# Patient Record
Sex: FEMALE | URBAN - METROPOLITAN AREA
[De-identification: names, ages, dates, MRNs, and addresses within clinical notes are randomized per-mention and may not be internally consistent; named-entity substitution may affect disease eponyms.]

---

## 2023-05-24 ENCOUNTER — ANESTHESIA EVENT (OUTPATIENT)
Dept: PERIOP | Facility: HOSPITAL | Age: 88
End: 2023-05-24

## 2023-05-24 RX ORDER — FUROSEMIDE 40 MG/1
40 TABLET ORAL 3 TIMES WEEKLY
COMMUNITY
Start: 2023-05-19

## 2023-05-24 RX ORDER — ALBUTEROL SULFATE 90 UG/1
2 AEROSOL, METERED RESPIRATORY (INHALATION) EVERY 6 HOURS PRN
COMMUNITY
Start: 2023-04-18

## 2023-05-24 RX ORDER — SENNOSIDES 8.6 MG
650 CAPSULE ORAL AS NEEDED
COMMUNITY

## 2023-05-24 RX ORDER — NYSTATIN 100000 U/G
1 CREAM TOPICAL 2 TIMES DAILY
COMMUNITY
Start: 2022-06-25 | End: 2023-06-25

## 2023-05-24 RX ORDER — FLUTICASONE PROPIONATE AND SALMETEROL XINAFOATE 230; 21 UG/1; UG/1
2 AEROSOL, METERED RESPIRATORY (INHALATION) 2 TIMES DAILY
COMMUNITY

## 2023-05-24 RX ORDER — AMOXICILLIN 250 MG
1 CAPSULE ORAL 2 TIMES DAILY PRN
COMMUNITY
Start: 2023-05-17 | End: 2024-05-16

## 2023-05-24 RX ORDER — ALBUTEROL SULFATE 2.5 MG/3ML
2.5 SOLUTION RESPIRATORY (INHALATION) EVERY 6 HOURS PRN
COMMUNITY
Start: 2023-04-04

## 2023-05-24 RX ORDER — DILTIAZEM HYDROCHLORIDE EXTENDED-RELEASE TABLETS 180 MG/1
180 TABLET, EXTENDED RELEASE ORAL DAILY
COMMUNITY

## 2023-05-24 RX ORDER — PSEUDOEPHEDRINE HCL 30 MG
100 TABLET ORAL DAILY
COMMUNITY

## 2023-05-24 NOTE — PRE-PROCEDURE INSTRUCTIONS
Pre-Surgery Instructions:   Medication Instructions   • acetaminophen (TYLENOL) 650 mg CR tablet Uses PRN- OK to take day of surgery   • albuterol (2 5 mg/3 mL) 0 083 % nebulizer solution Uses PRN- OK to take day of surgery   • albuterol (ProAir HFA) 90 mcg/act inhaler Uses PRN- OK to take day of surgery   • apixaban (ELIQUIS) 2 5 mg Instructions provided by MD   • dilTIAZem HCl  MG TB24 Take day of surgery  • Docusate Sodium (DSS) 100 MG CAPS Uses PRN- OK to take day of surgery   • fluticasone-salmeterol (ADVAIR HFA) 230-21 MCG/ACT inhaler Take day of surgery  • furosemide (LASIX) 40 mg tablet Hold day of surgery  • nystatin (MYCOSTATIN) cream Hold day of surgery  • senna-docusate sodium (SENOKOT S) 8 6-50 mg per tablet Uses PRN- OK to take day of surgery     Pt's dghtr verbalizes understanding of the following:    - Bathing instructions, will use dial  - No lotions, powders, sprays, deodorant, jewelry    - DO NOT EAT OR DRINK ANYTHING after midnight on the evening before your procedure including coffee, tea, gum or hard candy     - ONLY SIPS OF WATER with your medications are allowed on the morning of your procedure  - Avoid OTC non-directed Vit/ Suppl/ Herbals 7 days prior to surgery to ensure no drug interactions with perioperative surgical/ anesthetic meds  - Avoid NSAIDs 3 days prior  - Avoid ASA containing products 5 days prior    - Bring a list of meds you take at home with your last dose noted  - Bring INHALER you take for breathing problems     - Arrange for someone to drive you home after the procedure & stay with you until the next morning  - Bring insurance cards & photo id    - Leave all valuables such as credit cards, money & jewelry at home    - Notify surgeon if you develop any cold symptoms, change in your health history or develop a rash/ open wounds     - Did the surgeon's office give you any other special instructions? No  - Did surgeon require any clearances?  CC

## 2023-05-31 ENCOUNTER — HOSPITAL ENCOUNTER (OUTPATIENT)
Facility: HOSPITAL | Age: 88
Setting detail: OUTPATIENT SURGERY
Discharge: HOME | End: 2023-05-31
Attending: PLASTIC SURGERY | Admitting: PLASTIC SURGERY
Payer: MEDICARE

## 2023-05-31 ENCOUNTER — ANESTHESIA (OUTPATIENT)
Dept: PERIOP | Facility: HOSPITAL | Age: 88
End: 2023-05-31

## 2023-05-31 VITALS
BODY MASS INDEX: 20.91 KG/M2 | OXYGEN SATURATION: 94 % | DIASTOLIC BLOOD PRESSURE: 67 MMHG | HEART RATE: 66 BPM | SYSTOLIC BLOOD PRESSURE: 164 MMHG | HEIGHT: 63 IN | TEMPERATURE: 99.4 F | WEIGHT: 118 LBS | RESPIRATION RATE: 18 BRPM

## 2023-05-31 DIAGNOSIS — D49.2 NEOPLASM OF UNSPECIFIED BEHAVIOR OF BONE, SOFT TISSUE, AND SKIN: ICD-10-CM

## 2023-05-31 PROCEDURE — 88305 TISSUE EXAM BY PATHOLOGIST: CPT | Performed by: PATHOLOGY

## 2023-05-31 RX ORDER — FENTANYL CITRATE/PF 50 MCG/ML
25 SYRINGE (ML) INJECTION
Status: CANCELLED | OUTPATIENT
Start: 2023-05-31

## 2023-05-31 RX ORDER — ONDANSETRON 2 MG/ML
INJECTION INTRAMUSCULAR; INTRAVENOUS AS NEEDED
Status: DISCONTINUED | OUTPATIENT
Start: 2023-05-31 | End: 2023-05-31

## 2023-05-31 RX ORDER — ONDANSETRON 2 MG/ML
4 INJECTION INTRAMUSCULAR; INTRAVENOUS ONCE AS NEEDED
Status: CANCELLED | OUTPATIENT
Start: 2023-05-31

## 2023-05-31 RX ORDER — FENTANYL CITRATE 50 UG/ML
INJECTION, SOLUTION INTRAMUSCULAR; INTRAVENOUS AS NEEDED
Status: DISCONTINUED | OUTPATIENT
Start: 2023-05-31 | End: 2023-05-31

## 2023-05-31 RX ORDER — PROPOFOL 10 MG/ML
INJECTION, EMULSION INTRAVENOUS CONTINUOUS PRN
Status: DISCONTINUED | OUTPATIENT
Start: 2023-05-31 | End: 2023-05-31

## 2023-05-31 RX ORDER — MAGNESIUM HYDROXIDE 1200 MG/15ML
LIQUID ORAL AS NEEDED
Status: DISCONTINUED | OUTPATIENT
Start: 2023-05-31 | End: 2023-05-31 | Stop reason: HOSPADM

## 2023-05-31 RX ORDER — LIDOCAINE HYDROCHLORIDE AND EPINEPHRINE 10; 10 MG/ML; UG/ML
INJECTION, SOLUTION INFILTRATION; PERINEURAL AS NEEDED
Status: DISCONTINUED | OUTPATIENT
Start: 2023-05-31 | End: 2023-05-31 | Stop reason: HOSPADM

## 2023-05-31 RX ORDER — BALANCED SALT SOLUTION 6.4; .75; .48; .3; 3.9; 1.7 MG/ML; MG/ML; MG/ML; MG/ML; MG/ML; MG/ML
SOLUTION OPHTHALMIC AS NEEDED
Status: DISCONTINUED | OUTPATIENT
Start: 2023-05-31 | End: 2023-05-31 | Stop reason: HOSPADM

## 2023-05-31 RX ORDER — ONDANSETRON 4 MG/1
4 TABLET, ORALLY DISINTEGRATING ORAL EVERY 6 HOURS PRN
Status: DISCONTINUED | OUTPATIENT
Start: 2023-05-31 | End: 2023-05-31 | Stop reason: HOSPADM

## 2023-05-31 RX ORDER — SODIUM CHLORIDE, SODIUM LACTATE, POTASSIUM CHLORIDE, CALCIUM CHLORIDE 600; 310; 30; 20 MG/100ML; MG/100ML; MG/100ML; MG/100ML
INJECTION, SOLUTION INTRAVENOUS CONTINUOUS PRN
Status: DISCONTINUED | OUTPATIENT
Start: 2023-05-31 | End: 2023-05-31

## 2023-05-31 RX ORDER — ACETAMINOPHEN 325 MG/1
650 TABLET ORAL EVERY 6 HOURS PRN
Status: DISCONTINUED | OUTPATIENT
Start: 2023-05-31 | End: 2023-05-31 | Stop reason: HOSPADM

## 2023-05-31 RX ORDER — MINERAL OIL
OIL (ML) MISCELLANEOUS AS NEEDED
Status: DISCONTINUED | OUTPATIENT
Start: 2023-05-31 | End: 2023-05-31 | Stop reason: HOSPADM

## 2023-05-31 RX ORDER — LIDOCAINE HYDROCHLORIDE 10 MG/ML
INJECTION, SOLUTION EPIDURAL; INFILTRATION; INTRACAUDAL; PERINEURAL AS NEEDED
Status: DISCONTINUED | OUTPATIENT
Start: 2023-05-31 | End: 2023-05-31

## 2023-05-31 RX ORDER — SODIUM CHLORIDE, SODIUM LACTATE, POTASSIUM CHLORIDE, CALCIUM CHLORIDE 600; 310; 30; 20 MG/100ML; MG/100ML; MG/100ML; MG/100ML
125 INJECTION, SOLUTION INTRAVENOUS CONTINUOUS
Status: DISCONTINUED | OUTPATIENT
Start: 2023-05-31 | End: 2023-05-31 | Stop reason: HOSPADM

## 2023-05-31 RX ORDER — PROPOFOL 10 MG/ML
INJECTION, EMULSION INTRAVENOUS AS NEEDED
Status: DISCONTINUED | OUTPATIENT
Start: 2023-05-31 | End: 2023-05-31

## 2023-05-31 RX ORDER — HYDROMORPHONE HCL IN WATER/PF 6 MG/30 ML
0.2 PATIENT CONTROLLED ANALGESIA SYRINGE INTRAVENOUS
Status: CANCELLED | OUTPATIENT
Start: 2023-05-31 | End: 2023-05-31

## 2023-05-31 RX ADMIN — FENTANYL CITRATE 25 MCG: 50 INJECTION, SOLUTION INTRAMUSCULAR; INTRAVENOUS at 11:17

## 2023-05-31 RX ADMIN — FENTANYL CITRATE 25 MCG: 50 INJECTION, SOLUTION INTRAMUSCULAR; INTRAVENOUS at 11:01

## 2023-05-31 RX ADMIN — SODIUM CHLORIDE, SODIUM LACTATE, POTASSIUM CHLORIDE, AND CALCIUM CHLORIDE: .6; .31; .03; .02 INJECTION, SOLUTION INTRAVENOUS at 10:55

## 2023-05-31 RX ADMIN — PROPOFOL 40 MCG/KG/MIN: 10 INJECTION, EMULSION INTRAVENOUS at 10:57

## 2023-05-31 RX ADMIN — ONDANSETRON 4 MG: 2 INJECTION INTRAMUSCULAR; INTRAVENOUS at 11:39

## 2023-05-31 RX ADMIN — FENTANYL CITRATE 25 MCG: 50 INJECTION, SOLUTION INTRAMUSCULAR; INTRAVENOUS at 11:09

## 2023-05-31 RX ADMIN — PROPOFOL 30 MG: 10 INJECTION, EMULSION INTRAVENOUS at 10:57

## 2023-05-31 RX ADMIN — LIDOCAINE HYDROCHLORIDE 50 MG: 10 INJECTION, SOLUTION EPIDURAL; INFILTRATION; INTRACAUDAL at 10:57

## 2023-05-31 RX ADMIN — FENTANYL CITRATE 25 MCG: 50 INJECTION, SOLUTION INTRAMUSCULAR; INTRAVENOUS at 11:39

## 2023-05-31 NOTE — OP NOTE
OPERATIVE REPORT  PATIENT NAME: Niall Salazar    :  1935  MRN: [id number]  Pt Location:  OR ROOM 08    SURGERY DATE: 2023    Surgeon(s) and Role:     * Rosalee Franco MD - Primary    Preop and postoperative diagnosis: Right cheek lesion    Procedure(s):  Right - EXCISION CHEEK LESION  Right - STSG    Specimen(s):  ID Type Source Tests Collected by Time Destination   1 : Right cheek lesion suture @ superior margin Tissue Lesion TISSUE EXAM Rosalee Franco MD 2023 1103      Operative history: The patient for short time she says has had a large nodule develop on the right cheek area suspicious for skin cancer  Is the typical nodular lesion with a central keratin plug seen with squamous cell carcinoma  It measured 45 mm in diameter with a erythematous extension toward the nose of another centimeter  It was removed taking 5 mm margins  A suture was placed at the superior margin adjacent to the lateral canthal tendon region of the right orbit  A skin graft was necessary to cover the ensuing defect so created after its removal of size approximately 35 cm²  Operative procedure: The patient was taken to the operating placed supine on the operating table  She was prepped and draped in usual fashion  Anesthesia was general supplemented Xylocaine 1% with epinephrine  First wide excision of the right cheek lesion was performed and it was marked appropriately  This was facilitated with the Bovie for cutting and coagulation purposes  Next a [employer] electric dermatome was used to harvest a fourteen 1007 inch split-thickness skin graft from the right anterior lateral thigh  The graft was meshed 1/2-1  The graft was placed on the right cheek area with staples for fixation  A bulky dressing much like a stent was sewn over this with 3-0 silk simple sutures  The extra skin was returned to the donor site of the right anterior thigh where it also stapled into position with intent to hasten healing  Light dressings were placed on the left thigh  The patient tolerated the procedure well was taken to the recovery in good condition        SIGNATURE: Cameron Leonard MD  DATE: May 31, 2023  TIME: 12:00 PM

## 2023-05-31 NOTE — INTERVAL H&P NOTE
H&P reviewed  After examining the patient I find no changes in the patients condition since the H&P had been written      Vitals:    05/31/23 0918   BP: 169/72   Pulse: 65   Resp: 18   Temp: 99 °F (37 2 °C)   SpO2: 94%
H&P reviewed  After examining the patient I find no changes in the patients condition since the H&P had been written      Vitals:    05/31/23 0918   BP: 169/72   Pulse: 65   Resp: 18   Temp: 99 °F (37 2 °C)   SpO2: 94%
No cyanosis, clubbing or edema

## 2023-05-31 NOTE — ANESTHESIA PREPROCEDURE EVALUATION
Procedure:  EXCISION CHEEK LESION (Right: Face)  STSG (Right: Face)    Relevant Problems   No relevant active problems      Irregular heart beat Chronic kidney disease   [facility] ) Hypertension   Anemia Pulmonary embolism ([facility] )   Asthma Cancer ([place] [address] )   Pelvic fracture ([facility] ) COVID   Cholecystitis Pulmonary emphysema ([facility])   COPD (chronic obstructive pulmonary disease) ([place] [address] ) Coronary artery disease   Walker as ambulation aid Acute on chronic respiratory failure ([facility])   Sepsis ([facility])        Physical Exam    Airway    Mallampati score: II  TM Distance: <3 FB  Neck ROM: full     Dental   No notable dental hx     Cardiovascular  Cardiovascular exam normal    Pulmonary  Pulmonary exam normal     Other Findings        Anesthesia Plan  ASA Score- 4     Anesthesia Type- IV sedation with anesthesia with ASA Monitors  Additional Monitors:   Airway Plan:           Plan Factors-Exercise tolerance (METS): <4 METS  Chart reviewed  EKG reviewed  Existing labs reviewed  Patient is not a current smoker  Patient not instructed to abstain from smoking on day of procedure  Patient did not smoke on day of surgery  Induction- intravenous  Postoperative Plan-     Informed Consent- Anesthetic plan and risks discussed with patient  I personally reviewed this patient with the CRNA  Discussed and agreed on the Anesthesia Plan with the CRNA  Haroldo Winters

## 2023-05-31 NOTE — ANESTHESIA POSTPROCEDURE EVALUATION
Post-Op Assessment Note    CV Status:  Stable  Pain Score: 0    Pain management: adequate     Mental Status:  Alert and awake   Hydration Status:  Euvolemic   PONV Controlled:  Controlled   Airway Patency:  Patent      Post Op Vitals Reviewed: Yes      Staff: CRNA         No notable events documented      /70 (05/31/23 1150)    Temp 98 4 °F (36 9 °C) (05/31/23 1150)    Pulse 56 (05/31/23 1150)   Resp 16 (05/31/23 1150)    SpO2 96 % (05/31/23 1150)

## 2023-06-05 PROCEDURE — 88305 TISSUE EXAM BY PATHOLOGIST: CPT | Performed by: PATHOLOGY

## 2023-07-18 ENCOUNTER — CONSULT (OUTPATIENT)
Dept: SURGERY | Facility: CLINIC | Age: 88
End: 2023-07-18
Payer: MEDICARE

## 2023-07-18 VITALS
OXYGEN SATURATION: 97 % | TEMPERATURE: 98 F | DIASTOLIC BLOOD PRESSURE: 62 MMHG | HEIGHT: 62 IN | SYSTOLIC BLOOD PRESSURE: 136 MMHG | WEIGHT: 118 LBS | HEART RATE: 65 BPM | BODY MASS INDEX: 21.71 KG/M2

## 2023-07-18 DIAGNOSIS — J43.1 PANLOBULAR EMPHYSEMA (HCC): ICD-10-CM

## 2023-07-18 DIAGNOSIS — K80.10 CHRONIC CHOLECYSTITIS WITH CALCULUS: Primary | ICD-10-CM

## 2023-07-18 DIAGNOSIS — I48.91 ATRIAL FIBRILLATION (HCC): ICD-10-CM

## 2023-07-18 DIAGNOSIS — I50.32 CHRONIC DIASTOLIC CONGESTIVE HEART FAILURE (HCC): ICD-10-CM

## 2023-07-18 PROCEDURE — 99204 OFFICE O/P NEW MOD 45 MIN: CPT | Performed by: SPECIALIST

## 2023-07-24 NOTE — PROGRESS NOTES
Chief Complaint: Percutaneous cholecystostomy tube      History of Present Illness: The patient is a delightful 73-year-old white female in a wheelchair on nasal oxygen who presents to the office today with her daughter Jermaine Trent. She has an extensive past medical history which includes atrial fibrillation on chronic anticoagulation therapy, history of renal failure in the past, diastolic congestive heart failure, emphysema etc. etc.  She presents to the office today with her daughter to discuss possibly having her cholecystostomy tube removed and undergoing cholecystectomy. In discussing the situation with the patient she has no pain. She is tolerating her diet. She has good GI function. The tube is more of a nuisance and does not cause her discomfort. It may get caught on something every now and then etc. but other than that. ... Past Medical History:   Past Medical History:   Diagnosis Date   • Acute on chronic respiratory failure ([address])    • Anemia    • Asthma    • Cancer ([address])     basal cell multi sites   • Cholecystitis    • Chronic kidney disease    • COPD (chronic obstructive pulmonary disease) ([address])    • Coronary artery disease    • COVID 2020 2022   • Dialysis patient [facility])     T-Th-Sat   • Hypertension    • Irregular heart beat     AFIB   • Pelvic fracture ([facility]CC)    • Pulmonary embolism (HCC)    • Pulmonary emphysema ([address])    • Sepsis ([address])    • Walker as ambulation aid          Past Surgical History:    Past Surgical History:   Procedure Laterality Date   • BILIARY DRAINAGE CATHETER PLACEMENT     • FACIAL/NECK BIOPSY Right 5/31/2023    Procedure: EXCISION CHEEK LESION;  Surgeon: Blas Pinon MD;  Location: [address] [place] [address] MAIN OR;  Service: Plastics   • SKIN CANCER EXCISION      multiple   • SPLIT THICKNESS SKIN GRAFT Right 5/31/2023    Procedure: STSG;  Surgeon: Blas Pinon MD;  Location:  MAIN OR;  Service: Plastics         Allergies:     Allergies   Allergen Reactions   • Codeine Dizziness, Confusion and Other (See Comments)         Medications:    Current Outpatient Medications:   •  acetaminophen (TYLENOL) 650 mg CR tablet, Take 650 mg by mouth if needed, Disp: , Rfl:   •  albuterol (2.5 mg/3 mL) 0.083 % nebulizer solution, Inhale 2.5 mg every 6 (six) hours as needed, Disp: , Rfl:   •  albuterol (ProAir HFA) 90 mcg/act inhaler, Inhale 2 puffs every 6 (six) hours as needed, Disp: , Rfl:   •  apixaban (ELIQUIS) 2.5 mg, Take 2.5 mg by mouth 2 (two) times a day, Disp: , Rfl:   •  dilTIAZem HCl  MG TB24, Take 180 mg by mouth daily, Disp: , Rfl:   •  Docusate Sodium (DSS) 100 MG CAPS, Take 100 mg by mouth daily, Disp: , Rfl:   •  fluticasone-salmeterol (ADVAIR HFA) 230-21 MCG/ACT inhaler, Inhale 2 puffs 2 (two) times a day, Disp: , Rfl:   •  furosemide (LASIX) 40 mg tablet, Take 40 mg by mouth 3 (three) times a week, Disp: , Rfl:   •  nystatin (MYCOSTATIN) cream, Apply 1 application. topically 2 (two) times a day, Disp: , Rfl:   •  senna-docusate sodium (SENOKOT S) 8.6-50 mg per tablet, Take 1 tablet by mouth 2 (two) times a day as needed, Disp: , Rfl:       Social History:  Social History     Social History     Substance and Sexual Activity   Alcohol Use Never     Social History     Substance and Sexual Activity   Drug Use Never     Social History     Tobacco Use   Smoking Status Never   • Passive exposure: Never   Smokeless Tobacco Never         Family History:    Family History   Problem Relation Age of Onset   • Cancer Mother    • Heart disease Father    • Cancer Father          Review of Systems:    Dyspnea on exertion    Vitals:  Vitals:    07/18/23 1357   BP: 136/62   Pulse: 65   Temp: 98 °F (36.7 °C)   SpO2: 97%       Physical Exam:  Elderly white female in a wheelchair with nasal oxygen in place who is awake and alert and appropriate in no distress. Vital signs as above    Abdomen soft flat.   There is a cholecystostomy tube exiting the right subcostal area attached to a drainage bag with some pale greenish fluid. There is no significant erythema around the insertion site. Her abdomen is nontender. Lab Results: I have personally reviewed pertinent reports. See below. Imaging: I have personally reviewed pertinent reports. EKG, Pathology, and Other Studies: I have personally reviewed pertinent reports. No visits with results within 1 Day(s) from this visit. Latest known visit with results is:   Admission on 05/31/2023, Discharged on 05/31/2023   Component Date Value   • Case Report 05/31/2023                      Value:Surgical Pathology Report                         Case: [id number]                                   Authorizing Provider:  Renetta Mart MD       Collected:           05/31/2023 1103              Ordering Location:     [facility] Received:            05/31/2023 1 [facility] Operating Room                                                         Pathologist:           Duran Castro MD                                                             Specimen:    Lesion, Right cheek lesion suture @ superior margin                                       • Final Diagnosis 05/31/2023                      Value: This result contains rich text formatting which cannot be displayed here. • Additional Information 05/31/2023                      Value: This result contains rich text formatting which cannot be displayed here. • Gross Description 05/31/2023                      Value: This result contains rich text formatting which cannot be displayed here. Impression:  Cholecystostomy tube in place and functioning. In discussing the situation with her daughter she states that the cholecystostomy tube fell out at 1 time. Within 48 hours Harley Abel developed abdominal pain and evidence of sepsis. She sustained acute kidney injury necessitating dialysis which improved. She was treated with antibiotics and improved.   She has also had some imaging studies demonstrating that the cystic duct is obstructed. In discussing the situation frankly with Gary Silas and Dennie Millman she is quite stable in her current situation. She is currently asymptomatic. The tube may be a nuisance but other than that it is not causing any problems. Gary Rosen is essentially a prohibitive risk to undergo general anesthesia. Especially to remove her gallbladder when the with no way things are now the gallbladder is a nonissue. In essence, attempting to perform a cholecystectomy is a huge risk with no appreciable benefits. Plan: Both Garykonstantin Gutierrezz and Dennie Millman except this explanation and understand now. They agree and are appreciative of the explanation. At this point they are discharged in the office but if they have any other questions we would always be available.

## (undated) DEVICE — SUT SILK 3-0 SH 30 IN K832H

## (undated) DEVICE — SPONGE LAP 18 X 18 IN STRL RFD

## (undated) DEVICE — STANDARD SURGICAL GOWN, L: Brand: CONVERTORS

## (undated) DEVICE — ELECTRODE NEEDLE MOD E-Z CLEAN 2.75IN 7CM -0013M

## (undated) DEVICE — DISPOSABLE OR TOWEL: Brand: CARDINAL HEALTH

## (undated) DEVICE — SCD SEQUENTIAL COMPRESSION COMFORT SLEEVE MEDIUM KNEE LENGTH: Brand: KENDALL SCD

## (undated) DEVICE — DRESSING XEROFORM 5 X 9

## (undated) DEVICE — SPONGE 4 X 4 XRAY 16 PLY STRL LF RFD

## (undated) DEVICE — NEEDLE 25G X 1 1/2

## (undated) DEVICE — BLADE ELECTRO MODEL B DISP

## (undated) DEVICE — 1820 FOAM BLOCK NEEDLE COUNTER: Brand: DEVON

## (undated) DEVICE — COTTON TIP APPLICTOR 2 PK

## (undated) DEVICE — ABDOMINAL PAD: Brand: DERMACEA

## (undated) DEVICE — 1.5 TO 1 DERMACARRIER: Brand: MESHGRAFTTM  II TISSUE EXPANSION SYSTEM

## (undated) DEVICE — SINGLE PORT MANIFOLD: Brand: NEPTUNE 2

## (undated) DEVICE — TIBURON SPLIT SHEET: Brand: CONVERTORS

## (undated) DEVICE — SUPER SPONGES,MEDIUM: Brand: KERLIX

## (undated) DEVICE — BULB SYRINGE,IRRIGATION WITH PROTECTIVE CAP: Brand: DOVER

## (undated) DEVICE — LIGHT GLOVE GREEN

## (undated) DEVICE — TUBING SUCTION 5MM X 12 FT

## (undated) DEVICE — BASIC PACK: Brand: CONVERTORS

## (undated) DEVICE — BASIC SINGLE BASIN-LF: Brand: MEDLINE INDUSTRIES, INC.

## (undated) DEVICE — INTENDED FOR TISSUE SEPARATION, AND OTHER PROCEDURES THAT REQUIRE A SHARP SURGICAL BLADE TO PUNCTURE OR CUT.: Brand: BARD-PARKER SAFETY BLADES SIZE 15, STERILE

## (undated) DEVICE — SYRINGE 30ML LL

## (undated) DEVICE — SURGICAL CLIPPER BLADE GENERAL USE

## (undated) DEVICE — STERILE POLYISOPRENE POWDER-FREE SURGICAL GLOVES: Brand: PROTEXIS

## (undated) DEVICE — CABLE BIPOLAR DISP MEGADYNE

## (undated) DEVICE — GAUZE SPONGES,16 PLY: Brand: CURITY

## (undated) DEVICE — SYRINGE 10ML LL CONTROL TOP

## (undated) DEVICE — COTTON BALLS: Brand: DEROYAL

## (undated) DEVICE — PENCIL ELECTROSURG E-Z CLEAN -0035H

## (undated) DEVICE — CRADLE EXTREMITY UNIVERSAL CONTOURED

## (undated) DEVICE — SKIN MARKER DUAL TIP WITH RULER CAP, FLEXIBLE RULER AND LABELS: Brand: DEVON

## (undated) DEVICE — BOWL: 16OZ PEELPOUCH 75/CS 16/PLT: Brand: MEDEGEN MEDICAL PRODUCTS, LLC

## (undated) DEVICE — NEEDLE BLUNT 18 G X 1 1/2IN